# Patient Record
Sex: MALE | NOT HISPANIC OR LATINO | Employment: OTHER | URBAN - METROPOLITAN AREA
[De-identification: names, ages, dates, MRNs, and addresses within clinical notes are randomized per-mention and may not be internally consistent; named-entity substitution may affect disease eponyms.]

---

## 2018-01-15 ENCOUNTER — OFFICE VISIT (OUTPATIENT)
Dept: URGENT CARE | Facility: CLINIC | Age: 73
End: 2018-01-15
Payer: COMMERCIAL

## 2018-01-15 DIAGNOSIS — J02.9 ACUTE PHARYNGITIS: ICD-10-CM

## 2018-01-15 LAB — S PYO AG THROAT QL: NEGATIVE

## 2018-01-15 PROCEDURE — 87430 STREP A AG IA: CPT

## 2018-01-15 PROCEDURE — G0382 LEV 3 HOSP TYPE B ED VISIT: HCPCS

## 2018-01-15 PROCEDURE — 99283 EMERGENCY DEPT VISIT LOW MDM: CPT

## 2018-01-16 NOTE — PROGRESS NOTES
Assessment   1  Acute pharyngitis (462) (J02 9)    Plan   Acute pharyngitis    · Benzonatate 100 MG Oral Capsule; TAKE 1 CAPSULE 3 TIMES DAILY AS NEEDED   · Lidocaine Viscous 2 % Mouth/Throat Solution; USE 5ML EVERY 2 HOURS AS    NEEDED  Sore throat    · (1) THROAT CULTURE (CULTURE, UPPER RESPIRATORY); Status:Active -    Retrospective By Protocol Authorization; Requested [id number];    · Rapid StrepA- POC; Source:Throat; Status:Resulted - Requires Verification,Retrospective    By Protocol Authorization;   Done: [id number] 11:07AM    Discussion/Summary   Discussion Summary:    Rapid strep test-negative  Patient placed on xylocaine viscous as needed for sore throat, Tessalon Perles 100 mg q 8 hours  I advised patient follow up with primary care provider if symptoms persist or worsens  Medication Side Effects Reviewed: Possible side effects of new medications were reviewed with the patient/guardian today  Understands and agrees with treatment plan: The treatment plan was reviewed with the patient/guardian  The patient/guardian understands and agrees with the treatment plan    Counseling Documentation With Imm: The patient was counseled regarding  Chief Complaint   1  Fever  Chief Complaint Free Text Note Form: fever, sore throat x4 days, (pt finished round of amoxicillian and symptoms came back)      History of Present Illness   HPI: Patient complaining of sore throat and fever for the last 4 days  Prior to symptoms about 10 days ago, he started himself on amoxicillin he had at home with some improvement  However symptoms have returned  He also describes some mild nasal congestion at the present time and postnasal drip  Denies any fever  Also refers to 2 day history of hoarseness  Refers to nonproductive cough  Review of Systems   Focused-Male:      Constitutional: no fever or chills, feels well, no tiredness, no recent weight loss or weight gain  ENT: sore throat-- and-- nasal discharge  Respiratory: cough  Active Problems   1  Sore throat (462) (J02 9)    Past Medical History   1  History of cardiac disorder (V12 50) (Z86 79)  Active Problems And Past Medical History Reviewed: The active problems and past medical history were reviewed and updated today  Family History   Mother    1  No pertinent family history  Father    2  No pertinent family history  Family History    3  No pertinent family history    Social History    · Never a smoker   · Non drinker / no alcohol use    Current Meds    1  Metoprolol Succinate ER 25 MG Oral Tablet Extended Release 24 Hour; Therapy: (Recorded:[id number]) to Recorded  Medication List Reviewed: The medication list was reviewed and updated today  Allergies   1  No Known Drug Allergies    Vitals   Signs   Recorded: [id number] 10:14AM   Temperature: 96 5 F  Heart Rate: 82  Respiration: 18  Systolic: 724  Diastolic: 74  Height: 5 ft 8 90 in  Weight: 219 lb 1 76 oz  BMI Calculated: 32 45  BSA Calculated: 2 15  O2 Saturation: 97    Physical Exam        Constitutional      General appearance: No acute distress, well appearing and well nourished  Ears, Nose, Mouth, and Throat      External inspection of ears and nose: Normal        Otoscopic examination: Tympanic membrance translucent with normal light reflex  Canals patent without erythema  Nasal mucosa, septum, and turbinates: Abnormal  -- Hypertrophic turbinates  Oropharynx: Normal with no erythema, edema, exudate or lesions  Pulmonary      Auscultation of lungs: Clear to auscultation  Lymphatic      Palpation of lymph nodes in neck: No lymphadenopathy         Results/Data   Rapid [facility] Credit- POC [id number] 11:07AM Mckenna Fletcher      Test Name Result Flag Reference   Rapid Strep Negative                    Lab Studies Reviewed: Rapid strep test negative      Signatures    Electronically signed by : BRIANNE Conroy ; Davon 15 2018 11:18AM EST                       (Author)

## 2018-01-23 VITALS
DIASTOLIC BLOOD PRESSURE: 74 MMHG | HEIGHT: 69 IN | SYSTOLIC BLOOD PRESSURE: 111 MMHG | OXYGEN SATURATION: 97 % | WEIGHT: 219.11 LBS | BODY MASS INDEX: 32.45 KG/M2 | HEART RATE: 82 BPM | TEMPERATURE: 96.5 F | RESPIRATION RATE: 18 BRPM